# Patient Record
Sex: MALE | Race: BLACK OR AFRICAN AMERICAN | Employment: FULL TIME | URBAN - METROPOLITAN AREA
[De-identification: names, ages, dates, MRNs, and addresses within clinical notes are randomized per-mention and may not be internally consistent; named-entity substitution may affect disease eponyms.]

---

## 2017-05-18 PROBLEM — S76.211A GROIN STRAIN, RIGHT, INITIAL ENCOUNTER: Status: ACTIVE | Noted: 2017-05-18

## 2018-01-15 PROCEDURE — 88305 TISSUE EXAM BY PATHOLOGIST: CPT | Performed by: UROLOGY

## 2018-02-12 PROBLEM — C61 PROSTATE CANCER (HCC): Status: ACTIVE | Noted: 2018-02-12

## 2021-04-05 ENCOUNTER — HOSPITAL ENCOUNTER (EMERGENCY)
Facility: HOSPITAL | Age: 61
Discharge: HOME | End: 2021-04-05
Attending: EMERGENCY MEDICINE
Payer: OTHER MISCELLANEOUS

## 2021-04-05 ENCOUNTER — APPOINTMENT (OUTPATIENT)
Dept: GENERAL RADIOLOGY | Facility: HOSPITAL | Age: 61
End: 2021-04-05
Attending: PHYSICIAN ASSISTANT
Payer: OTHER MISCELLANEOUS

## 2021-04-05 VITALS
TEMPERATURE: 98 F | WEIGHT: 195 LBS | RESPIRATION RATE: 18 BRPM | HEART RATE: 60 BPM | BODY MASS INDEX: 27.3 KG/M2 | HEIGHT: 71 IN | DIASTOLIC BLOOD PRESSURE: 82 MMHG | SYSTOLIC BLOOD PRESSURE: 140 MMHG | OXYGEN SATURATION: 96 %

## 2021-04-05 DIAGNOSIS — M54.42 ACUTE LEFT-SIDED BACK PAIN WITH SCIATICA: Primary | ICD-10-CM

## 2021-04-05 PROCEDURE — 96374 THER/PROPH/DIAG INJ IV PUSH: CPT | Performed by: EMERGENCY MEDICINE

## 2021-04-05 PROCEDURE — 72100 X-RAY EXAM L-S SPINE 2/3 VWS: CPT | Performed by: PHYSICIAN ASSISTANT

## 2021-04-05 PROCEDURE — 99284 EMERGENCY DEPT VISIT MOD MDM: CPT | Performed by: EMERGENCY MEDICINE

## 2021-04-05 PROCEDURE — 96375 TX/PRO/DX INJ NEW DRUG ADDON: CPT | Performed by: EMERGENCY MEDICINE

## 2021-04-05 RX ORDER — HYDROCODONE BITARTRATE AND ACETAMINOPHEN 5; 325 MG/1; MG/1
1-2 TABLET ORAL EVERY 6 HOURS PRN
Qty: 10 TABLET | Refills: 0 | Status: SHIPPED | OUTPATIENT
Start: 2021-04-05 | End: 2021-04-12

## 2021-04-05 RX ORDER — HYDROMORPHONE HYDROCHLORIDE 1 MG/ML
1 INJECTION, SOLUTION INTRAMUSCULAR; INTRAVENOUS; SUBCUTANEOUS ONCE
Status: COMPLETED | OUTPATIENT
Start: 2021-04-05 | End: 2021-04-05

## 2021-04-05 RX ORDER — CYCLOBENZAPRINE HCL 5 MG
5 TABLET ORAL NIGHTLY
Qty: 15 TABLET | Refills: 0 | Status: SHIPPED | OUTPATIENT
Start: 2021-04-05 | End: 2021-04-10

## 2021-04-05 RX ORDER — METHYLPREDNISOLONE 4 MG/1
TABLET ORAL
Qty: 1 PACKAGE | Refills: 0 | Status: SHIPPED | OUTPATIENT
Start: 2021-04-05 | End: 2021-04-13

## 2021-04-05 RX ORDER — MORPHINE SULFATE 4 MG/ML
4 INJECTION, SOLUTION INTRAMUSCULAR; INTRAVENOUS ONCE
Status: COMPLETED | OUTPATIENT
Start: 2021-04-05 | End: 2021-04-05

## 2021-04-05 RX ORDER — METHYLPREDNISOLONE SODIUM SUCCINATE 125 MG/2ML
125 INJECTION, POWDER, LYOPHILIZED, FOR SOLUTION INTRAMUSCULAR; INTRAVENOUS ONCE
Status: COMPLETED | OUTPATIENT
Start: 2021-04-05 | End: 2021-04-05

## 2021-04-05 NOTE — ED PROVIDER NOTES
Patient Seen in: [facility] Emergency Department      History   Patient presents with:  Back Pain  Numbness Weakness    Stated Complaint: back pain , numbness     HPI/Subjective:   HPI     CHIEF COMPLAINT: Left-sided back pain with pain radiating to calcium score 197   • Elevated serum creatinine    • Essential hypertension    • Essential hypertension, benign    • Glaucoma    • Glucose intolerance (impaired glucose tolerance)    • Hyperlipidemia    • Prostate CA (HCC)    • Pure hypercholesterolemia intact, no deficits. cranial nerves are intact, 5 out of 5 symmetric upper and lower extremity motor strength. Gait normal.  Skin:  warm and dry, no rashes. No jaundice.  Brisk capillary refill  Musculoskeletal: neck is supple, no lymphadenopathy, non ten radiology results with the patient and his wife.  I discussed the diagnosis and need for followup with their PCP, I also referred him to the [facility] for further evaluation and care, but to return immediately to the ER for worsening, concernin hours as needed for Pain. Qty: 10 tablet Refills: 0    cyclobenzaprine 5 MG Oral Tab  Take 1 tablet (5 mg total) by mouth nightly for 5 days.   Qty: 15 tablet Refills: 0

## 2021-04-05 NOTE — ED QUICK NOTES
Got patient up to walk. Pt took very small shuffling steps and after about 4 steps he said the pain was becoming bad. Pt put in wheel chair and back to bed.

## 2021-04-05 NOTE — ED INITIAL ASSESSMENT (HPI)
Pt here stating yesterday he attempted to get out of bed and he was too weak to get up with LLE pain with left lower back pain using lidocaine patch. Pt took diclofenac and muscle relaxer no relief.

## 2021-04-06 ENCOUNTER — OFFICE VISIT (OUTPATIENT)
Dept: PAIN CLINIC | Facility: CLINIC | Age: 61
End: 2021-04-06
Payer: COMMERCIAL

## 2021-04-06 VITALS
HEIGHT: 71 IN | RESPIRATION RATE: 18 BRPM | BODY MASS INDEX: 27.3 KG/M2 | WEIGHT: 195 LBS | HEART RATE: 66 BPM | OXYGEN SATURATION: 97 % | SYSTOLIC BLOOD PRESSURE: 114 MMHG | DIASTOLIC BLOOD PRESSURE: 80 MMHG

## 2021-04-06 DIAGNOSIS — M54.42 ACUTE LEFT-SIDED LOW BACK PAIN WITH LEFT-SIDED SCIATICA: Primary | ICD-10-CM

## 2021-04-06 PROCEDURE — 3074F SYST BP LT 130 MM HG: CPT | Performed by: NURSE PRACTITIONER

## 2021-04-06 PROCEDURE — 3079F DIAST BP 80-89 MM HG: CPT | Performed by: NURSE PRACTITIONER

## 2021-04-06 PROCEDURE — 3008F BODY MASS INDEX DOCD: CPT | Performed by: NURSE PRACTITIONER

## 2021-04-06 PROCEDURE — 99203 OFFICE O/P NEW LOW 30 MIN: CPT | Performed by: NURSE PRACTITIONER

## 2021-04-06 NOTE — PROGRESS NOTES
HPI/Subjective:   Patient ID: Arlyn Lin is a 64year old male.     HPI    History/Other:   Review of Systems  Current Outpatient Medications   Medication Sig Dispense Refill   • methylPREDNISolone (MEDROL) 4 MG Oral Tablet Therapy Pack Dosepack: take stairs    Aggravating Factors:    Walking    Past Treatments for Current Pain Condition:   Other ER VISIT    Prior diagnostic testing for your pain:  N/A

## 2021-04-06 NOTE — PROGRESS NOTES
Name: July Lopez   : 1960   DOS: 2021     Chief complaint: Patient presents with:  New Patient: NP, ER follow up 21      History of present illness:  July Lopez is a 64year old male complaining of  Low back pain that radiates MOUTH DAILY 90 tablet 0   • atorvastatin 20 MG Oral Tab TAKE 1 TABLET(20 MG) BY MOUTH EVERY NIGHT 90 tablet 3   • amLODIPine Besylate 10 MG Oral Tab Take 1 tablet (10 mg total) by mouth daily.  90 tablet 3   • latanoprost (XALATAN) 0.005 % Ophthalmic Soluti hemophilia or any other vascular problems. Dermatology: Negative for all skin problems. Hematolgy/Lymph: negative for all the hematological problems. Neuropsychiatric:  Denies, anxiety, depression, suicidal ideation.       Physical examination: Ricky Anderson i indicates understanding of these issues and agrees to the plan. Return if symptoms worsen or fail to improve. Thank you for allowing me to assist in your patient's care.    SG Clement-MASHA  Pain Management [facility]

## 2021-04-06 NOTE — PROGRESS NOTES
HPI/Subjective:   Patient ID: Tressa Landry is a 64year old male.     HPI    History/Other:   Review of Systems  Current Outpatient Medications   Medication Sig Dispense Refill   • methylPREDNISolone (MEDROL) 4 MG Oral Tablet Therapy Pack Dosepack: take

## 2021-04-09 DIAGNOSIS — Z23 NEED FOR VACCINATION: ICD-10-CM

## 2021-12-17 PROBLEM — F02.81 EARLY ONSET ALZHEIMER'S DEMENTIA WITH BEHAVIORAL DISTURBANCE (HCC): Status: ACTIVE | Noted: 2021-12-17

## 2021-12-17 PROBLEM — G30.0 EARLY ONSET ALZHEIMER'S DEMENTIA WITH BEHAVIORAL DISTURBANCE (HCC): Status: ACTIVE | Noted: 2021-12-17

## (undated) NOTE — LETTER
Date & Time: 4/5/2021, 3:11 PM  Patient: Dileep Whitmore  Encounter Provider(s):    MD Pato Whitaker, [address]       To Whom It May Concern:    Skye Purdy was seen and treated in our department on 4/5/2021.  He should not return to w